# Patient Record
Sex: MALE | Race: BLACK OR AFRICAN AMERICAN
[De-identification: names, ages, dates, MRNs, and addresses within clinical notes are randomized per-mention and may not be internally consistent; named-entity substitution may affect disease eponyms.]

---

## 2018-11-22 ENCOUNTER — HOSPITAL ENCOUNTER (EMERGENCY)
Dept: HOSPITAL 62 - ER | Age: 28
Discharge: HOME | End: 2018-11-22
Payer: SELF-PAY

## 2018-11-22 VITALS — SYSTOLIC BLOOD PRESSURE: 159 MMHG | DIASTOLIC BLOOD PRESSURE: 104 MMHG

## 2018-11-22 DIAGNOSIS — F17.200: ICD-10-CM

## 2018-11-22 DIAGNOSIS — N63.41: ICD-10-CM

## 2018-11-22 DIAGNOSIS — N63.42: Primary | ICD-10-CM

## 2018-11-22 DIAGNOSIS — Z80.3: ICD-10-CM

## 2018-11-22 PROCEDURE — 84146 ASSAY OF PROLACTIN: CPT

## 2018-11-22 PROCEDURE — 99283 EMERGENCY DEPT VISIT LOW MDM: CPT

## 2018-11-22 PROCEDURE — 36415 COLL VENOUS BLD VENIPUNCTURE: CPT

## 2018-11-22 NOTE — ER DOCUMENT REPORT
HPI





- HPI


Patient complains to provider of: Breast lumps


Time Seen by Provider: 11/22/18 18:20


Onset: Other - 3 weeks


Onset/Duration: Persistent


Quality of pain: Achy


Pain Level: 2


Context: 





Patient complains of breast lump to bilateral breast for the past 3 weeks.  

Patient denies any fever drainage or discharge.  Patient denies any cough or 

recent illness.  Patient without any sore throat.  Patient denies any fever.  

Patient does report a family history of breast cancer.  Patient states he came 

in today because this was his only day off.


Associated Symptoms: Other - Breast lump


Exacerbated by: Denies


Relieved by: Denies


Similar symptoms previously: No


Recently seen / treated by doctor: No





- ROS


ROS below otherwise negative: Yes


Systems Reviewed and Negative: Yes All other systems reviewed and negative





- CONSTITUTIONAL


Constitutional: DENIES: Fever, Chills





- EENT


EENT: DENIES: Sore Throat





- NEURO


Neurology: DENIES: Headache





- CARDIOVASCULAR


Cardiovascular: DENIES: Chest pain





- DERM


Skin Color: Normal


Skin Problems: None





Past Medical History





- General


Information source: Patient





- Social History


Smoking Status: Current Every Day Smoker


Smoking Education Provided: Yes


Frequency of alcohol use: None


Drug Abuse: None


Occupation: 


Family History: Other - Breast cancer





- Past Medical History


Cardiac Medical History: Reports: Hx Hypertension


Surgical Hx: Negative





Vertical Provider Document





- CONSTITUTIONAL


Agree With Documented VS: Yes


Exam Limitations: No Limitations


General Appearance: WD/WN, No Apparent Distress





- INFECTION CONTROL


TRAVEL OUTSIDE OF THE [place] IN LAST 30 DAYS: No





- HEENT


HEENT: Atraumatic, Normocephalic





- NECK


Neck: Normal Inspection, Supple.  negative: Lymphadenopathy-Left, 

Lymphadenopathy-Right





- RESPIRATORY


Respiratory: Breath Sounds Normal, No Respiratory Distress


Notes: 





Patient with tender lump to right breast at the 12 o'clock position just 

superior to areola of R breast, pt with additional small nodular lump under L 

breast areola, normal skin color and temperature to bilateral breasts, normal 

contour, no concern for abscess





- CARDIOVASCULAR


Cardiovascular: Regular Rate, Regular Rhythm, No Murmur





- BACK


Back: Normal Inspection





- MUSCULOSKELETAL/EXTREMETIES


Musculoskeletal/Extremeties: MAEW, FROM





- NEURO


Level of Consciousness: Awake, Alert, Appropriate


Motor/Sensory: No Motor Deficit





- DERM


Integumentary: Warm, Dry, No Rash.  negative: Abscess





Course





- Re-evaluation


Re-evalutation: 





11/22/18 18:23


Consulted with Dr. Ames regarding patient presentation diagnostic 

evaluation.  Recommends obtaining a prolactin level and advising patient to 

follow-up with primary doctor on outpatient basis.





- Vital Signs


Vital signs: 


 











Temp Pulse Resp BP Pulse Ox


 


 98.6 F   103 H  20   158/103 H  97 


 


 11/22/18 18:08  11/22/18 18:08  11/22/18 18:08  11/22/18 18:08  11/22/18 18:08














Discharge





- Discharge


Clinical Impression: 


 Breast lump





Condition: Stable


Disposition: HOME, SELF-CARE


Instructions:  Breast Lumps ([facility])


Additional Instructions: 


Return immediately for any new or worsening symptoms





Followup with your primary care provider, call tomorrow to make a followup 

appointment





Follow-up with a primary doctor for further evaluation, they can schedule you 

an outpatient mammogram and/or ultrasound for further evaluation of lumps to 

breast bilaterally


Forms:  Smoking Cessation Education


Referrals: 


[facility] [Provider Group] - Follow up as needed


[facility] [Provider Group] - 11/26/18